# Patient Record
Sex: FEMALE
[De-identification: names, ages, dates, MRNs, and addresses within clinical notes are randomized per-mention and may not be internally consistent; named-entity substitution may affect disease eponyms.]

---

## 2018-06-19 NOTE — DIAGNOSTIC IMAGING REPORT
PROCEDURE: X-RAY CHEST, TWO VIEWS

COMPARISON: None.

INDICATIONS: PRE OPERATIVE CHEST X-RAY FOR ELBOW SURGERY

 

FINDINGS:



The lungs are well-inflated. No focal airspace consolidation, pleural 

effusion, or pneumothorax. Nodular opacities projecting over the lung 

bases likely represent nipple shadows. Tortuosity and atherosclerotic 

calcification of the thoracic aorta. Normal heart size. No overt 

pulmonary edema.

 

No acute osseous abnormality. Surgical clips project over the right 

upper quadrant of the abdomen compatible with cholecystectomy.

 

CONCLUSION:

No acute cardiopulmonary abnormality. 

 

 

Dictated by:  Alfred Moyer M.D. on 6/19/2018 at 11:38     

Electronically approved by:  Alfred Moyer M.D. on 6/19/2018 at 11:38

## 2018-06-20 ENCOUNTER — HOSPITAL ENCOUNTER (OUTPATIENT)
Dept: HOSPITAL 88 - OR | Age: 78
Discharge: HOME | End: 2018-06-20
Attending: SPECIALIST
Payer: COMMERCIAL
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
## 2018-06-20 DIAGNOSIS — Z01.818: ICD-10-CM

## 2018-06-20 DIAGNOSIS — S42.451A: Primary | ICD-10-CM
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
## 2018-06-20 DIAGNOSIS — N18.9: ICD-10-CM

## 2018-06-20 DIAGNOSIS — Z79.82: ICD-10-CM
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
## 2018-06-20 DIAGNOSIS — R00.1: ICD-10-CM

## 2018-06-20 DIAGNOSIS — Z01.810: ICD-10-CM

## 2018-06-20 DIAGNOSIS — I12.9: ICD-10-CM

## 2018-06-20 PROCEDURE — 71046 X-RAY EXAM CHEST 2 VIEWS: CPT

## 2018-06-20 PROCEDURE — 24579 OPTX HUMRL CNDYLR FRACTURE: CPT

## 2018-06-20 PROCEDURE — 93005 ELECTROCARDIOGRAM TRACING: CPT

## 2018-06-20 PROCEDURE — 76000 FLUOROSCOPY <1 HR PHYS/QHP: CPT

## 2018-06-23 NOTE — OPERATIVE REPORT
DATE OF PROCEDURE:  June 20, 2018 

 

PREOPERATIVE DIAGNOSIS:  Fracture of the lateral condyle of the right 

distal humerus.



POSTOPERATIVE DIAGNOSIS:  Fracture of the lateral condyle of the right 

distal humerus.



OPERATIONS/PROCEDURE PERFORMED:  Patient underwent an open reduction and 

internal fixation of the right lateral condyle fracture of the right distal 

humerus. 



ASSISTANT:  None.



ANESTHESIA:  General endotracheal intubation anesthesia.



IV FLUIDS:  Per anesthesia record.



BRIEF DESCRIPTION OF OPERATIVE PROCEDURE:  Ms. Mckay was taken to the 

operating room and placed in supine position on the operating table.  

Following induction of general anesthesia as well as endotracheal 

intubation, patient's right upper extremity was examined under anesthesia.  

She was found to have bruising and swelling about the elbow joint.  

Fluoroscopic evaluation of the patient's elbow demonstrated an 

intra-articular fracture of the lateral condyle of the humerus.  There was 

mild displacement at the time of surgery.  The patient's upper extremity 

was prepped and draped in standard surgical fashion.  The case was begun by 

creating incision along the posterior aspect of the arm extending over the 

olecranon process and ulna.  This incision was carried through skin only.  

Blunt dissection was used to deepen the incision to the level of the 

triceps musculature.  A window was made in the lateral aspect of the distal 

triceps musculature exposing the patient's lateral condyle.  The fracture 

site was easily identified.  It was debrided and reduced.  A plate was 

contoured to the posterior aspect of the lateral condyle and the locking 

plate was then affixed to the posterolateral column of the distal humerus 

with combinations of both cortical and locking screws.  The position of 

that plate, reduction of fracture and reestablishment of the articular 

surface of the joint was then assessed using fluoroscopy and found to be 

appropriate.





The elbow was placed in range of motion and found be stable.  There was no 

motion at the fracture site.  This wound was then copiously irrigated and 

closed in a multilayer fashion.  Sterile dressings were applied as well as 

a well-padded double sugar-tong splint.  The patient was then awakened and 

taken to the postanesthesia care unit in stable condition.









DD:  06/23/2018 10:55

DT:  06/23/2018 15:12

Job#:  [id number]

## 2023-03-06 LAB
ANION GAP SERPL CALC-SCNC: 15.3 MMOL/L (ref 8–16)
BASOPHILS # BLD AUTO: 0 10*3/UL (ref 0–0.1)
BASOPHILS NFR BLD AUTO: 0.5 % (ref 0–1)
BUN SERPL-MCNC: 38 MG/DL (ref 7–26)
BUN/CREAT SERPL: 28 (ref 6–25)
CALCIUM SERPL-MCNC: 9.7 MG/DL (ref 8.4–10.2)
CHLORIDE SERPL-SCNC: 109 MMOL/L (ref 98–107)
CO2 SERPL-SCNC: 20 MMOL/L (ref 22–29)
DEPRECATED APTT PLAS QN: 27.8 SECONDS (ref 23.8–35.5)
DEPRECATED INR PLAS: 0.95
DEPRECATED NEUTROPHILS # BLD AUTO: 5 10*3/UL (ref 2.1–6.9)
EOSINOPHIL # BLD AUTO: 0.1 10*3/UL (ref 0–0.4)
EOSINOPHIL NFR BLD AUTO: 0.9 % (ref 0–6)
ERYTHROCYTE [DISTWIDTH] IN CORD BLOOD: 13.5 % (ref 11.7–14.4)
GLUCOSE SERPLBLD-MCNC: 107 MG/DL (ref 74–118)
HCT VFR BLD AUTO: 40.4 % (ref 34.2–44.1)
HGB BLD-MCNC: 12.9 G/DL (ref 12–16)
LYMPHOCYTES # BLD: 3 10*3/UL (ref 1–3.2)
LYMPHOCYTES NFR BLD AUTO: 33.6 % (ref 18–39.1)
MCH RBC QN AUTO: 30.1 PG (ref 28–32)
MCHC RBC AUTO-ENTMCNC: 31.9 G/DL (ref 31–35)
MCV RBC AUTO: 94.2 FL (ref 81–99)
MONOCYTES # BLD AUTO: 0.7 10*3/UL (ref 0.2–0.8)
MONOCYTES NFR BLD AUTO: 8.2 % (ref 4.4–11.3)
NEUTS SEG NFR BLD AUTO: 56.6 % (ref 38.7–80)
PLATELET # BLD AUTO: 166 X10E3/UL (ref 140–360)
POTASSIUM SERPL-SCNC: 5.3 MMOL/L (ref 3.5–5.1)
PROTHROMBIN TIME: 12.9 SECONDS (ref 11.9–14.5)
RBC # BLD AUTO: 4.29 X10E6/UL (ref 3.6–5.1)
SODIUM SERPL-SCNC: 139 MMOL/L (ref 136–145)

## 2023-03-07 ENCOUNTER — HOSPITAL ENCOUNTER (OUTPATIENT)
Dept: HOSPITAL 88 - OR | Age: 83
Discharge: HOME | End: 2023-03-07
Attending: OPHTHALMOLOGY
Payer: COMMERCIAL

## 2023-03-07 VITALS — DIASTOLIC BLOOD PRESSURE: 59 MMHG | SYSTOLIC BLOOD PRESSURE: 120 MMHG

## 2023-03-07 DIAGNOSIS — Z79.899: ICD-10-CM

## 2023-03-07 DIAGNOSIS — H25.11: Primary | ICD-10-CM

## 2023-03-07 DIAGNOSIS — N18.30: ICD-10-CM

## 2023-03-07 DIAGNOSIS — Z01.812: ICD-10-CM

## 2023-03-07 DIAGNOSIS — I12.9: ICD-10-CM

## 2023-03-07 DIAGNOSIS — R00.2: ICD-10-CM

## 2023-03-07 PROCEDURE — 84132 ASSAY OF SERUM POTASSIUM: CPT

## 2023-03-07 PROCEDURE — 85025 COMPLETE CBC W/AUTO DIFF WBC: CPT

## 2023-03-07 PROCEDURE — 36415 COLL VENOUS BLD VENIPUNCTURE: CPT

## 2023-03-07 PROCEDURE — 85730 THROMBOPLASTIN TIME PARTIAL: CPT

## 2023-03-07 PROCEDURE — 80048 BASIC METABOLIC PNL TOTAL CA: CPT

## 2023-03-07 PROCEDURE — 85610 PROTHROMBIN TIME: CPT

## 2023-04-04 ENCOUNTER — HOSPITAL ENCOUNTER (OUTPATIENT)
Dept: HOSPITAL 88 - OR | Age: 83
Discharge: HOME | End: 2023-04-04
Attending: OPHTHALMOLOGY
Payer: COMMERCIAL

## 2023-04-04 VITALS — DIASTOLIC BLOOD PRESSURE: 66 MMHG | SYSTOLIC BLOOD PRESSURE: 132 MMHG

## 2023-04-04 DIAGNOSIS — Z79.899: ICD-10-CM

## 2023-04-04 DIAGNOSIS — H25.12: Primary | ICD-10-CM
